# Patient Record
Sex: MALE | Race: WHITE | NOT HISPANIC OR LATINO
[De-identification: names, ages, dates, MRNs, and addresses within clinical notes are randomized per-mention and may not be internally consistent; named-entity substitution may affect disease eponyms.]

---

## 2023-07-26 ENCOUNTER — TRANSCRIPTION ENCOUNTER (OUTPATIENT)
Age: 48
End: 2023-07-26

## 2024-04-24 ENCOUNTER — APPOINTMENT (OUTPATIENT)
Dept: FAMILY MEDICINE | Facility: CLINIC | Age: 49
End: 2024-04-24

## 2024-05-09 ENCOUNTER — APPOINTMENT (OUTPATIENT)
Dept: PULMONOLOGY | Facility: CLINIC | Age: 49
End: 2024-05-09
Payer: COMMERCIAL

## 2024-05-09 VITALS
SYSTOLIC BLOOD PRESSURE: 110 MMHG | OXYGEN SATURATION: 97 % | BODY MASS INDEX: 34.07 KG/M2 | WEIGHT: 230 LBS | HEIGHT: 69 IN | HEART RATE: 70 BPM | DIASTOLIC BLOOD PRESSURE: 70 MMHG

## 2024-05-09 DIAGNOSIS — F41.9 ANXIETY DISORDER, UNSPECIFIED: ICD-10-CM

## 2024-05-09 DIAGNOSIS — R80.9 PROTEINURIA, UNSPECIFIED: ICD-10-CM

## 2024-05-09 DIAGNOSIS — G47.33 OBSTRUCTIVE SLEEP APNEA (ADULT) (PEDIATRIC): ICD-10-CM

## 2024-05-09 PROBLEM — Z00.00 ENCOUNTER FOR PREVENTIVE HEALTH EXAMINATION: Status: ACTIVE | Noted: 2024-05-09

## 2024-05-09 PROCEDURE — 99203 OFFICE O/P NEW LOW 30 MIN: CPT

## 2024-05-09 RX ORDER — ROSUVASTATIN CALCIUM 5 MG/1
5 TABLET, FILM COATED ORAL DAILY
Refills: 0 | Status: ACTIVE | COMMUNITY

## 2024-05-09 RX ORDER — DAPAGLIFLOZIN 10 MG/1
10 TABLET, FILM COATED ORAL DAILY
Refills: 0 | Status: ACTIVE | COMMUNITY

## 2024-05-09 RX ORDER — FLUVOXAMINE MALEATE 100 MG/1
100 TABLET, FILM COATED ORAL
Refills: 0 | Status: ACTIVE | COMMUNITY

## 2024-05-09 RX ORDER — LOSARTAN POTASSIUM 100 MG/1
100 TABLET, FILM COATED ORAL DAILY
Refills: 0 | Status: ACTIVE | COMMUNITY

## 2024-05-09 NOTE — HISTORY OF PRESENT ILLNESS
[TextBox_4] : 48-year-old male who has been treated for CPAP for the past several years.  He has been on CPAP therapy since 2017.  His machine is working well but he would like a new machine.  He is currently 5 feet 9 weight 234.  He has lost 100 pounds over the past several years.  He was 100 pounds heavier when he had his original sleep study in 2017.  He is fully compliant with CPAP and uses it all night every night.  He sleeps generally from 9 PM to 5 AM and sleeps through the night.  He generally feels rested during the day.  He has no complaints with his CPAP.  Past medical history hypertension and anxiety disorder.  His current medications include Luvox, losartan, Farxiga and Crestor.  He is a non-smoker nondrinker.

## 2024-05-09 NOTE — ASSESSMENT
[FreeTextEntry1] : Patient with likely severe obstructive sleep apnea syndrome.  Will repeat a home sleep study as he has not done the sleep study in several years.  He has lost 100 pounds since his last study.  I will also try to obtain a new AutoPap unit as his current CPAP unit is 7 years old.